# Patient Record
Sex: FEMALE | Race: WHITE | NOT HISPANIC OR LATINO | URBAN - METROPOLITAN AREA
[De-identification: names, ages, dates, MRNs, and addresses within clinical notes are randomized per-mention and may not be internally consistent; named-entity substitution may affect disease eponyms.]

---

## 2017-05-01 ENCOUNTER — OUTPATIENT (OUTPATIENT)
Dept: OUTPATIENT SERVICES | Facility: HOSPITAL | Age: 52
LOS: 1 days | End: 2017-05-01
Payer: COMMERCIAL

## 2017-05-01 ENCOUNTER — APPOINTMENT (OUTPATIENT)
Dept: ULTRASOUND IMAGING | Facility: CLINIC | Age: 52
End: 2017-05-01

## 2017-05-01 ENCOUNTER — APPOINTMENT (OUTPATIENT)
Dept: RADIOLOGY | Facility: CLINIC | Age: 52
End: 2017-05-01

## 2017-05-01 ENCOUNTER — APPOINTMENT (OUTPATIENT)
Dept: MAMMOGRAPHY | Facility: CLINIC | Age: 52
End: 2017-05-01

## 2017-05-01 DIAGNOSIS — Z00.00 ENCOUNTER FOR GENERAL ADULT MEDICAL EXAMINATION WITHOUT ABNORMAL FINDINGS: ICD-10-CM

## 2017-05-01 PROCEDURE — 76641 ULTRASOUND BREAST COMPLETE: CPT

## 2017-05-01 PROCEDURE — 77067 SCR MAMMO BI INCL CAD: CPT

## 2017-05-01 PROCEDURE — 77080 DXA BONE DENSITY AXIAL: CPT

## 2017-05-01 PROCEDURE — 77063 BREAST TOMOSYNTHESIS BI: CPT

## 2017-05-09 DIAGNOSIS — Z78.0 ASYMPTOMATIC MENOPAUSAL STATE: ICD-10-CM

## 2017-05-09 DIAGNOSIS — N60.01 SOLITARY CYST OF RIGHT BREAST: ICD-10-CM

## 2017-05-09 DIAGNOSIS — N60.12 DIFFUSE CYSTIC MASTOPATHY OF LEFT BREAST: ICD-10-CM

## 2017-05-09 DIAGNOSIS — Z12.31 ENCOUNTER FOR SCREENING MAMMOGRAM FOR MALIGNANT NEOPLASM OF BREAST: ICD-10-CM

## 2018-02-26 ENCOUNTER — APPOINTMENT (OUTPATIENT)
Dept: OPHTHALMOLOGY | Facility: CLINIC | Age: 53
End: 2018-02-26
Payer: COMMERCIAL

## 2018-02-26 DIAGNOSIS — H04.123 DRY EYE SYNDROME OF BILATERAL LACRIMAL GLANDS: ICD-10-CM

## 2018-02-26 PROCEDURE — 92002 INTRM OPH EXAM NEW PATIENT: CPT

## 2018-05-21 ENCOUNTER — APPOINTMENT (OUTPATIENT)
Dept: OPHTHALMOLOGY | Facility: CLINIC | Age: 53
End: 2018-05-21

## 2018-08-08 ENCOUNTER — APPOINTMENT (OUTPATIENT)
Dept: PULMONOLOGY | Facility: CLINIC | Age: 53
End: 2018-08-08
Payer: COMMERCIAL

## 2018-08-08 VITALS
HEART RATE: 76 BPM | DIASTOLIC BLOOD PRESSURE: 80 MMHG | HEIGHT: 64 IN | SYSTOLIC BLOOD PRESSURE: 124 MMHG | RESPIRATION RATE: 16 BRPM | WEIGHT: 138 LBS | BODY MASS INDEX: 23.56 KG/M2 | OXYGEN SATURATION: 99 %

## 2018-08-08 DIAGNOSIS — J30.9 ALLERGIC RHINITIS, UNSPECIFIED: ICD-10-CM

## 2018-08-08 DIAGNOSIS — Z00.00 ENCOUNTER FOR GENERAL ADULT MEDICAL EXAMINATION W/OUT ABNORMAL FINDINGS: ICD-10-CM

## 2018-08-08 DIAGNOSIS — H01.024 SQUAMOUS BLEPHARITIS RIGHT UPPER EYELID: ICD-10-CM

## 2018-08-08 DIAGNOSIS — H01.022 SQUAMOUS BLEPHARITIS RIGHT UPPER EYELID: ICD-10-CM

## 2018-08-08 DIAGNOSIS — H01.025 SQUAMOUS BLEPHARITIS RIGHT UPPER EYELID: ICD-10-CM

## 2018-08-08 DIAGNOSIS — H01.021 SQUAMOUS BLEPHARITIS RIGHT UPPER EYELID: ICD-10-CM

## 2018-08-08 DIAGNOSIS — R06.2 WHEEZING: ICD-10-CM

## 2018-08-08 PROCEDURE — 99204 OFFICE O/P NEW MOD 45 MIN: CPT | Mod: 25

## 2018-08-08 PROCEDURE — 94010 BREATHING CAPACITY TEST: CPT

## 2018-08-08 RX ORDER — ALBUTEROL SULFATE 90 UG/1
108 (90 BASE) AEROSOL, METERED RESPIRATORY (INHALATION)
Refills: 0 | Status: ACTIVE | COMMUNITY

## 2018-08-08 RX ORDER — IPRATROPIUM BROMIDE AND ALBUTEROL SULFATE .5; 3 MG/3ML; MG/3ML
2.5-0.5 SOLUTION RESPIRATORY (INHALATION)
Refills: 0 | Status: ACTIVE | COMMUNITY

## 2018-10-29 ENCOUNTER — OUTPATIENT (OUTPATIENT)
Dept: OUTPATIENT SERVICES | Facility: HOSPITAL | Age: 53
LOS: 1 days | End: 2018-10-29
Payer: COMMERCIAL

## 2018-10-29 ENCOUNTER — APPOINTMENT (OUTPATIENT)
Dept: MAMMOGRAPHY | Facility: CLINIC | Age: 53
End: 2018-10-29
Payer: COMMERCIAL

## 2018-10-29 ENCOUNTER — APPOINTMENT (OUTPATIENT)
Dept: ULTRASOUND IMAGING | Facility: CLINIC | Age: 53
End: 2018-10-29
Payer: COMMERCIAL

## 2018-10-29 DIAGNOSIS — Z12.31 ENCOUNTER FOR SCREENING MAMMOGRAM FOR MALIGNANT NEOPLASM OF BREAST: ICD-10-CM

## 2018-10-29 PROCEDURE — 77067 SCR MAMMO BI INCL CAD: CPT

## 2018-10-29 PROCEDURE — 77067 SCR MAMMO BI INCL CAD: CPT | Mod: 26

## 2018-10-29 PROCEDURE — 77063 BREAST TOMOSYNTHESIS BI: CPT | Mod: 26

## 2018-10-29 PROCEDURE — 77063 BREAST TOMOSYNTHESIS BI: CPT

## 2018-11-05 ENCOUNTER — APPOINTMENT (OUTPATIENT)
Dept: PULMONOLOGY | Facility: CLINIC | Age: 53
End: 2018-11-05

## 2019-01-04 ENCOUNTER — RX CHANGE (OUTPATIENT)
Age: 54
End: 2019-01-04

## 2019-04-04 ENCOUNTER — CHART COPY (OUTPATIENT)
Age: 54
End: 2019-04-04

## 2019-04-29 ENCOUNTER — RX RENEWAL (OUTPATIENT)
Age: 54
End: 2019-04-29

## 2019-04-29 RX ORDER — OSELTAMIVIR PHOSPHATE 75 MG/1
75 CAPSULE ORAL TWICE DAILY
Qty: 1 | Refills: 0 | Status: COMPLETED | COMMUNITY
Start: 2019-01-04 | End: 2019-04-29

## 2019-04-29 RX ORDER — AMOXICILLIN AND CLAVULANATE POTASSIUM 500; 125 MG/1; MG/1
500-125 TABLET, FILM COATED ORAL TWICE DAILY
Qty: 14 | Refills: 0 | Status: COMPLETED | COMMUNITY
Start: 2019-02-11 | End: 2019-04-29

## 2019-04-29 RX ORDER — OSELTAMIVIR PHOSPHATE 75 MG/1
75 CAPSULE ORAL TWICE DAILY
Qty: 10 | Refills: 0 | Status: COMPLETED | COMMUNITY
Start: 2019-01-04 | End: 2019-04-29

## 2020-07-20 ENCOUNTER — OUTPATIENT (OUTPATIENT)
Dept: OUTPATIENT SERVICES | Facility: HOSPITAL | Age: 55
LOS: 1 days | End: 2020-07-20

## 2020-07-20 DIAGNOSIS — Z12.31 ENCOUNTER FOR SCREENING MAMMOGRAM FOR MALIGNANT NEOPLASM OF BREAST: ICD-10-CM

## 2020-07-22 ENCOUNTER — APPOINTMENT (OUTPATIENT)
Dept: PULMONOLOGY | Facility: CLINIC | Age: 55
End: 2020-07-22
Payer: COMMERCIAL

## 2020-07-22 VITALS
SYSTOLIC BLOOD PRESSURE: 118 MMHG | HEART RATE: 77 BPM | DIASTOLIC BLOOD PRESSURE: 70 MMHG | HEIGHT: 64 IN | WEIGHT: 134 LBS | BODY MASS INDEX: 22.88 KG/M2 | OXYGEN SATURATION: 98 %

## 2020-07-22 PROCEDURE — 99215 OFFICE O/P EST HI 40 MIN: CPT

## 2020-07-22 RX ORDER — NITROFURANTOIN (MONOHYDRATE/MACROCRYSTALS) 25; 75 MG/1; MG/1
100 CAPSULE ORAL TWICE DAILY
Qty: 10 | Refills: 0 | Status: DISCONTINUED | COMMUNITY
Start: 2019-04-29 | End: 2020-07-22

## 2020-07-22 RX ORDER — AMOXICILLIN AND CLAVULANATE POTASSIUM 500; 125 MG/1; 1/1
TABLET, FILM COATED ORAL
Refills: 0 | Status: ACTIVE | COMMUNITY

## 2020-07-22 RX ORDER — ALBUTEROL SULFATE 2.5 MG/3ML
(2.5 MG/3ML) SOLUTION RESPIRATORY (INHALATION)
Qty: 1 | Refills: 3 | Status: ACTIVE | COMMUNITY
Start: 2020-07-22 | End: 1900-01-01

## 2020-07-22 RX ORDER — ALBUTEROL SULFATE 90 UG/1
108 (90 BASE) AEROSOL, METERED RESPIRATORY (INHALATION)
Qty: 1 | Refills: 3 | Status: ACTIVE | COMMUNITY
Start: 2020-07-22 | End: 1900-01-01

## 2020-07-22 NOTE — HISTORY OF PRESENT ILLNESS
[TextBox_4] : had nasal discharge, roslyn baires MD, given augmentin -9 days\par nasal discharge improved\par voice hoarse, has post nasal drip, cough worse when lying supine\par has hx asthma, uses neb daily\par works [employer]\par has rescue inhaler which helps\par recent covid [facility], results pending\par

## 2020-07-22 NOTE — DISCUSSION/SUMMARY
[FreeTextEntry1] : rhinosinusitis /upper airway cough, asthma by hx, ? GERD component\par currently without any dyspnea, lung exam is normal, normal sp02\par previous spirometries were normal, minimal allergies followed at ENT/allergy, minimal smoking hx\par Covid pending from [facility]\par Symbicort with spacer, medrol, add dymista \par ENT to see, add pepcid, HOB elevated\par PFTs/Covid, CXR\par 3 months or sooner if needed

## 2020-07-22 NOTE — PHYSICAL EXAM
[No Acute Distress] : no acute distress [Normal Rate/Rhythm] : normal rate/rhythm [Normal Appearance] : normal appearance [Normal S1, S2] : normal s1, s2 [No Murmurs] : no murmurs [No Resp Distress] : no resp distress [Normal Rhythm and Effort] : normal rhythm and effort [Clear to Auscultation Bilaterally] : clear to auscultation bilaterally [No Abnormalities] : no abnormalities [Normal Gait] : normal gait [No Clubbing] : no clubbing [No Cyanosis] : no cyanosis [No Edema] : no edema [Normal Color/ Pigmentation] : normal color/ pigmentation [Normal Affect] : normal affect [Oriented x3] : oriented x3 [No Focal Deficits] : no focal deficits

## 2020-07-22 NOTE — CONSULT LETTER
[Consult Letter:] : I had the pleasure of evaluating your patient, [unfilled]. [Dear  ___] : Dear  [unfilled], [Sincerely,] : Sincerely, [Please see my note below.] : Please see my note below. [FreeTextEntry3] : Jason Coredro DO [facility]\par Pulmonary Critical Care\par Director Pulmonary Division\par Medical Director Respiratory Therapy\par [facility]\par \par

## 2020-08-16 DIAGNOSIS — Z12.31 ENCOUNTER FOR SCREENING MAMMOGRAM FOR MALIGNANT NEOPLASM OF BREAST: ICD-10-CM

## 2020-08-17 ENCOUNTER — APPOINTMENT (OUTPATIENT)
Dept: MAMMOGRAPHY | Facility: CLINIC | Age: 55
End: 2020-08-17

## 2020-08-17 ENCOUNTER — OUTPATIENT (OUTPATIENT)
Dept: OUTPATIENT SERVICES | Facility: HOSPITAL | Age: 55
LOS: 1 days | End: 2020-08-17

## 2020-08-17 DIAGNOSIS — Z12.31 ENCOUNTER FOR SCREENING MAMMOGRAM FOR MALIGNANT NEOPLASM OF BREAST: ICD-10-CM

## 2020-09-29 ENCOUNTER — OUTPATIENT (OUTPATIENT)
Dept: OUTPATIENT SERVICES | Facility: HOSPITAL | Age: 55
LOS: 1 days | End: 2020-09-29
Payer: COMMERCIAL

## 2020-09-29 ENCOUNTER — APPOINTMENT (OUTPATIENT)
Dept: MAMMOGRAPHY | Facility: CLINIC | Age: 55
End: 2020-09-29
Payer: COMMERCIAL

## 2020-09-29 ENCOUNTER — RESULT REVIEW (OUTPATIENT)
Age: 55
End: 2020-09-29

## 2020-09-29 DIAGNOSIS — Z12.31 ENCOUNTER FOR SCREENING MAMMOGRAM FOR MALIGNANT NEOPLASM OF BREAST: ICD-10-CM

## 2020-09-29 PROCEDURE — 77067 SCR MAMMO BI INCL CAD: CPT | Mod: 26

## 2020-09-29 PROCEDURE — 77067 SCR MAMMO BI INCL CAD: CPT

## 2020-09-29 PROCEDURE — 77063 BREAST TOMOSYNTHESIS BI: CPT

## 2020-09-29 PROCEDURE — 77063 BREAST TOMOSYNTHESIS BI: CPT | Mod: 26

## 2020-10-14 ENCOUNTER — APPOINTMENT (OUTPATIENT)
Dept: OBGYN | Facility: CLINIC | Age: 55
End: 2020-10-14
Payer: COMMERCIAL

## 2020-10-14 VITALS
WEIGHT: 134 LBS | BODY MASS INDEX: 22.88 KG/M2 | SYSTOLIC BLOOD PRESSURE: 120 MMHG | DIASTOLIC BLOOD PRESSURE: 74 MMHG | HEIGHT: 64 IN

## 2020-10-14 DIAGNOSIS — Z87.42 PERSONAL HISTORY OF OTHER DISEASES OF THE FEMALE GENITAL TRACT: ICD-10-CM

## 2020-10-14 DIAGNOSIS — Z87.440 PERSONAL HISTORY OF URINARY (TRACT) INFECTIONS: ICD-10-CM

## 2020-10-14 DIAGNOSIS — Z78.9 OTHER SPECIFIED HEALTH STATUS: ICD-10-CM

## 2020-10-14 PROCEDURE — 81003 URINALYSIS AUTO W/O SCOPE: CPT | Mod: QW

## 2020-10-14 PROCEDURE — 99203 OFFICE O/P NEW LOW 30 MIN: CPT

## 2020-10-14 RX ORDER — NITROFURANTOIN (MONOHYDRATE/MACROCRYSTALS) 25; 75 MG/1; MG/1
100 CAPSULE ORAL TWICE DAILY
Qty: 10 | Refills: 0 | Status: ACTIVE | COMMUNITY
Start: 2020-10-14 | End: 1900-01-01

## 2020-10-15 PROBLEM — Z78.9 SOCIAL ALCOHOL USE: Status: ACTIVE | Noted: 2020-10-15

## 2020-10-15 NOTE — PLAN
[FreeTextEntry1] : 55 year old female with suspected uti.  Urine culture sent.  Vaginal culture sent.  Pelvic exam with vaginal dryness.  We discussed that uti's can increase after menopause due to dryness and atrophy.  We discussed starting post coital prophylaxis versus vaginal estrogen.  She is not interested in hormones at this time.  Will start post coital prophylaxis.  Will also rx macrobid 100mg BID x 5 days and pyridium 200mg TID prn.  call parameters reviewed.  Will RTO for wwe.

## 2020-10-15 NOTE — HISTORY OF PRESENT ILLNESS
[FreeTextEntry1] : 55 year old female presents for visit due to suspected uti.  She is a new patient to our practice.  She was previously seen at [facility] but has not seen me since early 2017.  She states 2 weeks ago she thought she had a uti.  She was treated with a course of macrobid.  States initially she was feeling better but had intercourse and symptoms returned.  She has been taking azo OTC with minimal relief.  She is complaining of urinary frequency, urgency and dysuria.  She denies vaginal symptoms.  She denies fever.  No back or pelvic pain.  She states she has noticed that since menopause she has been getting more frequent uti's.  She does experience some vaginal dryness.  \par

## 2020-10-15 NOTE — PHYSICAL EXAM
[Appropriately responsive] : appropriately responsive [Alert] : alert [No Acute Distress] : no acute distress [Soft] : soft [Non-tender] : non-tender [Non-distended] : non-distended [No HSM] : No HSM [No Lesions] : no lesions [No Mass] : no mass [Oriented x3] : oriented x3 [FreeTextEntry7] : No CVA tenderness [Labia Majora] : normal [Labia Minora] : normal [Dry Mucosa] : dry mucosa [Normal] : normal [Uterine Adnexae] : normal

## 2020-10-17 LAB
A VAGINAE DNA VAG QL NAA+PROBE: NORMAL
BACTERIA UR CULT: ABNORMAL
BVAB2 DNA VAG QL NAA+PROBE: NORMAL
C KRUSEI DNA VAG QL NAA+PROBE: NEGATIVE
C TRACH RRNA SPEC QL NAA+PROBE: NEGATIVE
MEGA1 DNA VAG QL NAA+PROBE: NORMAL
N GONORRHOEA RRNA SPEC QL NAA+PROBE: NEGATIVE
T VAGINALIS RRNA SPEC QL NAA+PROBE: NEGATIVE

## 2020-12-23 PROBLEM — Z87.440 HISTORY OF URINARY TRACT INFECTION: Status: RESOLVED | Noted: 2019-04-29 | Resolved: 2020-12-23

## 2020-12-23 PROBLEM — Z87.42 HISTORY OF VAGINITIS: Status: RESOLVED | Noted: 2020-10-14 | Resolved: 2020-12-23

## 2021-01-11 ENCOUNTER — APPOINTMENT (OUTPATIENT)
Dept: PULMONOLOGY | Facility: CLINIC | Age: 56
End: 2021-01-11
Payer: COMMERCIAL

## 2021-01-11 DIAGNOSIS — R06.00 DYSPNEA, UNSPECIFIED: ICD-10-CM

## 2021-01-11 PROCEDURE — 99214 OFFICE O/P EST MOD 30 MIN: CPT | Mod: 95

## 2021-01-12 ENCOUNTER — LABORATORY RESULT (OUTPATIENT)
Age: 56
End: 2021-01-12

## 2021-01-14 ENCOUNTER — NON-APPOINTMENT (OUTPATIENT)
Age: 56
End: 2021-01-14

## 2021-01-19 ENCOUNTER — TRANSCRIPTION ENCOUNTER (OUTPATIENT)
Age: 56
End: 2021-01-19

## 2021-09-13 ENCOUNTER — RX RENEWAL (OUTPATIENT)
Age: 56
End: 2021-09-13

## 2021-10-18 ENCOUNTER — RX CHANGE (OUTPATIENT)
Age: 56
End: 2021-10-18

## 2021-10-18 RX ORDER — BUDESONIDE AND FORMOTEROL FUMARATE DIHYDRATE 160; 4.5 UG/1; UG/1
160-4.5 AEROSOL RESPIRATORY (INHALATION) TWICE DAILY
Qty: 3 | Refills: 3 | Status: DISCONTINUED | COMMUNITY
Start: 2021-10-18 | End: 2021-10-18

## 2021-10-18 RX ORDER — INHALER, ASSIST DEVICES
SPACER (EA) MISCELLANEOUS
Qty: 1 | Refills: 4 | Status: ACTIVE | COMMUNITY
Start: 2020-07-22 | End: 1900-01-01

## 2021-10-19 ENCOUNTER — RX CHANGE (OUTPATIENT)
Age: 56
End: 2021-10-19

## 2021-11-10 ENCOUNTER — APPOINTMENT (OUTPATIENT)
Dept: OBGYN | Facility: CLINIC | Age: 56
End: 2021-11-10
Payer: COMMERCIAL

## 2021-11-10 VITALS
SYSTOLIC BLOOD PRESSURE: 148 MMHG | HEIGHT: 64 IN | DIASTOLIC BLOOD PRESSURE: 88 MMHG | BODY MASS INDEX: 21.85 KG/M2 | WEIGHT: 128 LBS

## 2021-11-10 PROCEDURE — 99396 PREV VISIT EST AGE 40-64: CPT

## 2021-11-22 LAB
CYTOLOGY CVX/VAG DOC THIN PREP: ABNORMAL
HPV HIGH+LOW RISK DNA PNL CVX: NOT DETECTED

## 2021-12-28 ENCOUNTER — OUTPATIENT (OUTPATIENT)
Dept: OUTPATIENT SERVICES | Facility: HOSPITAL | Age: 56
LOS: 1 days | End: 2021-12-28

## 2021-12-28 DIAGNOSIS — Z00.8 ENCOUNTER FOR OTHER GENERAL EXAMINATION: ICD-10-CM

## 2021-12-29 ENCOUNTER — APPOINTMENT (OUTPATIENT)
Dept: MAMMOGRAPHY | Facility: CLINIC | Age: 56
End: 2021-12-29
Payer: COMMERCIAL

## 2021-12-29 ENCOUNTER — RESULT REVIEW (OUTPATIENT)
Age: 56
End: 2021-12-29

## 2021-12-29 ENCOUNTER — OUTPATIENT (OUTPATIENT)
Dept: OUTPATIENT SERVICES | Facility: HOSPITAL | Age: 56
LOS: 1 days | End: 2021-12-29
Payer: COMMERCIAL

## 2021-12-29 DIAGNOSIS — Z00.8 ENCOUNTER FOR OTHER GENERAL EXAMINATION: ICD-10-CM

## 2021-12-29 DIAGNOSIS — Z12.31 ENCOUNTER FOR SCREENING MAMMOGRAM FOR MALIGNANT NEOPLASM OF BREAST: ICD-10-CM

## 2021-12-29 PROCEDURE — 77067 SCR MAMMO BI INCL CAD: CPT | Mod: 26

## 2021-12-29 PROCEDURE — 77063 BREAST TOMOSYNTHESIS BI: CPT

## 2021-12-29 PROCEDURE — 77067 SCR MAMMO BI INCL CAD: CPT

## 2021-12-29 PROCEDURE — 77063 BREAST TOMOSYNTHESIS BI: CPT | Mod: 26

## 2022-01-06 NOTE — PLAN
[FreeTextEntry1] : 56 year old female wwe\par \par 1. Pap done\par 2. Rx screening mammo and sono\par 3. Counseled on dexa - will get at age 64 \par 4. Up to date with screening colonoscopy\par 5. Vaginal atrophy - continue lubrication and vaginal moisturizers.  If no relief can discuss other treatment options. \par 6. Annual exam in 1 year

## 2022-01-06 NOTE — HISTORY OF PRESENT ILLNESS
[FreeTextEntry1] : 56 year old female presents for wwe.  She has no complaints today.  LMP was at the age of 49.  She does have some vaginal dryness and uses lubrication and revaree with good results.  She denies hot flashes.  She denies bladder and bowel symptoms.  PMH is significant for asthma.  She has had a LEEP in the past.  She is due for a mammo.  She is up to date with her screening colonoscopy.

## 2022-04-21 DIAGNOSIS — N39.0 URINARY TRACT INFECTION, SITE NOT SPECIFIED: ICD-10-CM

## 2022-04-21 RX ORDER — SULFAMETHOXAZOLE AND TRIMETHOPRIM 800; 160 MG/1; MG/1
800-160 TABLET ORAL TWICE DAILY
Qty: 14 | Refills: 0 | Status: ACTIVE | COMMUNITY
Start: 2022-04-21 | End: 1900-01-01

## 2022-04-25 LAB — BACTERIA UR CULT: NORMAL

## 2022-05-31 ENCOUNTER — RX RENEWAL (OUTPATIENT)
Age: 57
End: 2022-05-31

## 2022-06-01 ENCOUNTER — NON-APPOINTMENT (OUTPATIENT)
Age: 57
End: 2022-06-01

## 2022-06-27 ENCOUNTER — RX RENEWAL (OUTPATIENT)
Age: 57
End: 2022-06-27

## 2022-10-24 ENCOUNTER — APPOINTMENT (OUTPATIENT)
Dept: PULMONOLOGY | Facility: CLINIC | Age: 57
End: 2022-10-24

## 2022-12-23 RX ORDER — MONTELUKAST 10 MG/1
10 TABLET, FILM COATED ORAL DAILY
Qty: 30 | Refills: 0 | Status: ACTIVE | COMMUNITY
Start: 2018-08-08 | End: 1900-01-01

## 2022-12-23 RX ORDER — BUDESONIDE AND FORMOTEROL FUMARATE DIHYDRATE 160; 4.5 UG/1; UG/1
160-4.5 AEROSOL RESPIRATORY (INHALATION)
Qty: 1 | Refills: 0 | Status: ACTIVE | COMMUNITY
Start: 2021-10-18 | End: 1900-01-01

## 2023-01-01 ENCOUNTER — NON-APPOINTMENT (OUTPATIENT)
Age: 58
End: 2023-01-01

## 2023-01-12 ENCOUNTER — APPOINTMENT (OUTPATIENT)
Dept: MAMMOGRAPHY | Facility: CLINIC | Age: 58
End: 2023-01-12
Payer: COMMERCIAL

## 2023-01-12 ENCOUNTER — OUTPATIENT (OUTPATIENT)
Dept: OUTPATIENT SERVICES | Facility: HOSPITAL | Age: 58
LOS: 1 days | End: 2023-01-12
Payer: COMMERCIAL

## 2023-01-12 ENCOUNTER — APPOINTMENT (OUTPATIENT)
Dept: PULMONOLOGY | Facility: CLINIC | Age: 58
End: 2023-01-12
Payer: COMMERCIAL

## 2023-01-12 VITALS
DIASTOLIC BLOOD PRESSURE: 82 MMHG | SYSTOLIC BLOOD PRESSURE: 132 MMHG | RESPIRATION RATE: 16 BRPM | BODY MASS INDEX: 22.02 KG/M2 | HEART RATE: 80 BPM | OXYGEN SATURATION: 97 % | WEIGHT: 129 LBS | HEIGHT: 64 IN

## 2023-01-12 DIAGNOSIS — J45.909 UNSPECIFIED ASTHMA, UNCOMPLICATED: ICD-10-CM

## 2023-01-12 DIAGNOSIS — U07.1 COVID-19: ICD-10-CM

## 2023-01-12 DIAGNOSIS — R05.9 COUGH, UNSPECIFIED: ICD-10-CM

## 2023-01-12 DIAGNOSIS — Z12.31 ENCOUNTER FOR SCREENING MAMMOGRAM FOR MALIGNANT NEOPLASM OF BREAST: ICD-10-CM

## 2023-01-12 DIAGNOSIS — Z87.891 PERSONAL HISTORY OF NICOTINE DEPENDENCE: ICD-10-CM

## 2023-01-12 PROCEDURE — 77063 BREAST TOMOSYNTHESIS BI: CPT

## 2023-01-12 PROCEDURE — 77067 SCR MAMMO BI INCL CAD: CPT | Mod: 26

## 2023-01-12 PROCEDURE — 77063 BREAST TOMOSYNTHESIS BI: CPT | Mod: 26

## 2023-01-12 PROCEDURE — 99214 OFFICE O/P EST MOD 30 MIN: CPT

## 2023-01-12 PROCEDURE — 77067 SCR MAMMO BI INCL CAD: CPT

## 2023-01-12 RX ORDER — PHENAZOPYRIDINE 200 MG/1
200 TABLET, FILM COATED ORAL 3 TIMES DAILY
Qty: 15 | Refills: 0 | Status: DISCONTINUED | COMMUNITY
Start: 2020-10-14 | End: 2023-01-12

## 2023-01-12 RX ORDER — NITROFURANTOIN (MONOHYDRATE/MACROCRYSTALS) 25; 75 MG/1; MG/1
100 CAPSULE ORAL
Qty: 90 | Refills: 0 | Status: DISCONTINUED | COMMUNITY
Start: 2020-10-15 | End: 2023-01-12

## 2023-01-12 RX ORDER — METHYLPREDNISOLONE 4 MG/1
4 TABLET ORAL
Qty: 1 | Refills: 3 | Status: DISCONTINUED | COMMUNITY
Start: 2020-07-22 | End: 2023-01-12

## 2023-01-12 RX ORDER — NITROFURANTOIN (MONOHYDRATE/MACROCRYSTALS) 25; 75 MG/1; MG/1
100 CAPSULE ORAL TWICE DAILY
Qty: 10 | Refills: 0 | Status: DISCONTINUED | COMMUNITY
Start: 2020-09-29 | End: 2023-01-12

## 2023-01-12 RX ORDER — PHENAZOPYRIDINE 200 MG/1
200 TABLET, FILM COATED ORAL 3 TIMES DAILY
Qty: 15 | Refills: 0 | Status: DISCONTINUED | COMMUNITY
Start: 2022-04-21 | End: 2023-01-12

## 2023-01-12 RX ORDER — FLUCONAZOLE 150 MG/1
150 TABLET ORAL
Qty: 1 | Refills: 0 | Status: DISCONTINUED | COMMUNITY
Start: 2020-10-14 | End: 2023-01-12

## 2023-01-12 RX ORDER — FLUTICASONE PROPIONATE 50 UG/1
50 SPRAY, METERED NASAL DAILY
Qty: 1 | Refills: 3 | Status: ACTIVE | COMMUNITY
Start: 2023-01-12 | End: 1900-01-01

## 2023-01-12 NOTE — PHYSICAL EXAM
[General Appearance - In No Acute Distress] : no acute distress [Normal Conjunctiva] : the conjunctiva exhibited no abnormalities [Normal Oropharynx] : normal oropharynx [Neck Appearance] : the appearance of the neck was normal [] : the neck was supple [Heart Rate And Rhythm] : heart rate and rhythm were normal [Heart Sounds] : normal S1 and S2 [Normal Rate] : the respiratory rate was normal [Rate ___] : at [unfilled] breaths per minute [Clear Bilaterally] : the lungs were clear to auscultation bilaterally [Normal Breath Sounds] : normal bilateral breath sounds [Bowel Sounds] : normal bowel sounds [Abdomen Soft] : soft [Abnormal Walk] : normal gait [Nail Clubbing] : no clubbing of the fingernails [Cyanosis, Localized] : no localized cyanosis [No Focal Deficits] : no focal deficits [Oriented To Time, Place, And Person] : oriented to person, place, and time [Impaired Insight] : insight and judgment were intact [Affect] : the affect was normal

## 2023-01-12 NOTE — HISTORY OF PRESENT ILLNESS
[FreeTextEntry1] : Hx asthma. \par \par Had covid 1/2021. Treated with decadron. Improved. Another bout in Dec. Mild. No trt. \par \par Doing well with regards to asthma. No wheeze, sob. Only issue is throat clearing. Mucous in throat. On symbicort. Singulair.    \par \par Dx with SVT. Sees Dr Bliss.

## 2023-04-10 RX ORDER — AMOXICILLIN AND CLAVULANATE POTASSIUM 875; 125 MG/1; MG/1
875-125 TABLET, COATED ORAL TWICE DAILY
Qty: 14 | Refills: 0 | Status: ACTIVE | COMMUNITY
Start: 2023-04-10 | End: 1900-01-01

## 2023-09-06 RX ORDER — PHENAZOPYRIDINE 200 MG/1
200 TABLET, FILM COATED ORAL 3 TIMES DAILY
Qty: 30 | Refills: 0 | Status: ACTIVE | COMMUNITY
Start: 2023-09-06 | End: 1900-01-01

## 2023-09-06 RX ORDER — SULFAMETHOXAZOLE AND TRIMETHOPRIM 800; 160 MG/1; MG/1
800-160 TABLET ORAL TWICE DAILY
Qty: 10 | Refills: 0 | Status: ACTIVE | COMMUNITY
Start: 2023-09-06 | End: 1900-01-01

## 2023-09-07 ENCOUNTER — APPOINTMENT (OUTPATIENT)
Dept: DERMATOLOGY | Facility: CLINIC | Age: 58
End: 2023-09-07
Payer: COMMERCIAL

## 2023-09-07 PROCEDURE — 99203 OFFICE O/P NEW LOW 30 MIN: CPT

## 2023-09-08 LAB — BACTERIA UR CULT: NORMAL

## 2023-09-30 ENCOUNTER — NON-APPOINTMENT (OUTPATIENT)
Age: 58
End: 2023-09-30

## 2023-10-03 ENCOUNTER — APPOINTMENT (OUTPATIENT)
Dept: UROGYNECOLOGY | Facility: CLINIC | Age: 58
End: 2023-10-03
Payer: COMMERCIAL

## 2023-10-03 VITALS
RESPIRATION RATE: 16 BRPM | HEART RATE: 72 BPM | WEIGHT: 127 LBS | OXYGEN SATURATION: 98 % | BODY MASS INDEX: 20.66 KG/M2 | SYSTOLIC BLOOD PRESSURE: 134 MMHG | HEIGHT: 65.75 IN | DIASTOLIC BLOOD PRESSURE: 86 MMHG

## 2023-10-03 DIAGNOSIS — Z86.19 PERSONAL HISTORY OF OTHER INFECTIOUS AND PARASITIC DISEASES: ICD-10-CM

## 2023-10-03 DIAGNOSIS — R10.2 PELVIC AND PERINEAL PAIN: ICD-10-CM

## 2023-10-03 LAB
BILIRUB UR QL STRIP: NEGATIVE
CLARITY UR: CLEAR
COLLECTION METHOD: NORMAL
GLUCOSE UR-MCNC: NEGATIVE
HCG UR QL: 0.2 EU/DL
HGB UR QL STRIP.AUTO: NORMAL
KETONES UR-MCNC: NEGATIVE
LEUKOCYTE ESTERASE UR QL STRIP: NEGATIVE
NITRITE UR QL STRIP: NEGATIVE
PH UR STRIP: 6.5
PROT UR STRIP-MCNC: NEGATIVE
SP GR UR STRIP: <=1.005

## 2023-10-03 PROCEDURE — 99214 OFFICE O/P EST MOD 30 MIN: CPT | Mod: 25

## 2023-10-03 PROCEDURE — 81003 URINALYSIS AUTO W/O SCOPE: CPT | Mod: QW

## 2023-10-03 PROCEDURE — 99204 OFFICE O/P NEW MOD 45 MIN: CPT | Mod: 25

## 2023-10-03 PROCEDURE — 51701 INSERT BLADDER CATHETER: CPT | Mod: 59

## 2023-10-04 LAB
APPEARANCE: CLEAR
BACTERIA: NEGATIVE /HPF
BILIRUBIN URINE: NEGATIVE
BLOOD URINE: NEGATIVE
CAST: 0 /LPF
COLOR: YELLOW
EPITHELIAL CELLS: 0 /HPF
GLUCOSE QUALITATIVE U: NEGATIVE MG/DL
KETONES URINE: NEGATIVE MG/DL
LEUKOCYTE ESTERASE URINE: NEGATIVE
MICROSCOPIC-UA: NORMAL
NITRITE URINE: NEGATIVE
PH URINE: 6.5
PROTEIN URINE: NEGATIVE MG/DL
RED BLOOD CELLS URINE: 0 /HPF
SPECIFIC GRAVITY URINE: 1.01
UROBILINOGEN URINE: 0.2 MG/DL
WHITE BLOOD CELLS URINE: 0 /HPF

## 2023-10-05 LAB — BACTERIA UR CULT: NORMAL

## 2023-10-30 ENCOUNTER — APPOINTMENT (OUTPATIENT)
Dept: OBGYN | Facility: CLINIC | Age: 58
End: 2023-10-30
Payer: COMMERCIAL

## 2023-10-30 ENCOUNTER — NON-APPOINTMENT (OUTPATIENT)
Age: 58
End: 2023-10-30

## 2023-10-30 VITALS
BODY MASS INDEX: 21.99 KG/M2 | WEIGHT: 132 LBS | DIASTOLIC BLOOD PRESSURE: 84 MMHG | SYSTOLIC BLOOD PRESSURE: 130 MMHG | HEIGHT: 65 IN

## 2023-10-30 DIAGNOSIS — M62.89 OTHER SPECIFIED DISORDERS OF MUSCLE: ICD-10-CM

## 2023-10-30 DIAGNOSIS — D25.9 LEIOMYOMA OF UTERUS, UNSPECIFIED: ICD-10-CM

## 2023-10-30 DIAGNOSIS — Z01.419 ENCOUNTER FOR GYNECOLOGICAL EXAMINATION (GENERAL) (ROUTINE) W/OUT ABNORMAL FINDINGS: ICD-10-CM

## 2023-10-30 DIAGNOSIS — N95.2 POSTMENOPAUSAL ATROPHIC VAGINITIS: ICD-10-CM

## 2023-10-30 DIAGNOSIS — R39.9 UNSPECIFIED SYMPTOMS AND SIGNS INVOLVING THE GENITOURINARY SYSTEM: ICD-10-CM

## 2023-10-30 PROCEDURE — 99396 PREV VISIT EST AGE 40-64: CPT

## 2023-11-06 LAB
CYTOLOGY CVX/VAG DOC THIN PREP: ABNORMAL
HPV HIGH+LOW RISK DNA PNL CVX: NOT DETECTED

## 2023-12-04 RX ORDER — DIAZEPAM 100 %
POWDER (GRAM) MISCELLANEOUS
Qty: 90 | Refills: 0 | Status: ACTIVE | COMMUNITY
Start: 2023-10-04 | End: 1900-01-01

## 2023-12-22 RX ORDER — AMOXICILLIN AND CLAVULANATE POTASSIUM 875; 125 MG/1; MG/1
875-125 TABLET, COATED ORAL TWICE DAILY
Qty: 14 | Refills: 0 | Status: ACTIVE | COMMUNITY
Start: 2023-12-22 | End: 1900-01-01

## 2024-01-06 PROBLEM — M62.89 PELVIC FLOOR TENSION: Status: ACTIVE | Noted: 2023-10-03

## 2024-01-06 PROBLEM — Z01.419 WELL WOMAN EXAM WITH ROUTINE GYNECOLOGICAL EXAM: Status: ACTIVE | Noted: 2022-01-06

## 2024-01-06 PROBLEM — N95.2 ATROPHIC VAGINITIS: Status: ACTIVE | Noted: 2023-10-03

## 2024-01-06 PROBLEM — R39.9 UTI SYMPTOMS: Status: ACTIVE | Noted: 2023-10-03

## 2024-01-06 PROBLEM — D25.9 FIBROID UTERUS: Status: ACTIVE | Noted: 2024-01-06

## 2024-01-24 ENCOUNTER — APPOINTMENT (OUTPATIENT)
Dept: UROGYNECOLOGY | Facility: CLINIC | Age: 59
End: 2024-01-24
Payer: COMMERCIAL

## 2024-01-24 VITALS
BODY MASS INDEX: 20.91 KG/M2 | HEART RATE: 79 BPM | DIASTOLIC BLOOD PRESSURE: 64 MMHG | SYSTOLIC BLOOD PRESSURE: 99 MMHG | WEIGHT: 127 LBS | HEIGHT: 65.5 IN

## 2024-01-24 LAB
BILIRUB UR QL STRIP: NEGATIVE
CLARITY UR: CLEAR
COLLECTION METHOD: NORMAL
GLUCOSE UR-MCNC: NEGATIVE
HCG UR QL: 0.2 EU/DL
HGB UR QL STRIP.AUTO: NEGATIVE
KETONES UR-MCNC: NEGATIVE
LEUKOCYTE ESTERASE UR QL STRIP: NEGATIVE
NITRITE UR QL STRIP: NEGATIVE
PH UR STRIP: 7.5
PROT UR STRIP-MCNC: NEGATIVE
SP GR UR STRIP: 1.02

## 2024-01-24 PROCEDURE — 99213 OFFICE O/P EST LOW 20 MIN: CPT

## 2024-01-24 PROCEDURE — 81003 URINALYSIS AUTO W/O SCOPE: CPT | Mod: QW

## 2024-01-24 PROCEDURE — 51798 US URINE CAPACITY MEASURE: CPT

## 2024-01-24 NOTE — ADDENDUM
[FreeTextEntry1] : This note was written by Michelle Macario, acting as the  for Dr. Lorenz. This note accurately reflects the work and decisions made by Dr. Lorenz.

## 2024-01-24 NOTE — PHYSICAL EXAM
[Chaperone Present] : A chaperone was present in the examining room during all aspects of the physical examination [No Acute Distress] : in no acute distress [Well developed] : well developed [Well Nourished] : ~L well nourished [Oriented x3] : oriented to person, place, and time [Cough] : no cough [No Edema] : ~T edema was not present

## 2024-01-24 NOTE — ASSESSMENT
[FreeTextEntry1] : MIKEL is a 58 year female who presents for f/u on PFD/atrophy. Patient reports she has 90% improvement in symptoms with her current regimen and is very happy with results.   Plan: [] Continue pelvic floor PT and yoga [] Continue vaginal valium suppositories [] Continue estrogen cream twice weekly  [] Recommended heating pads  f/u as needed

## 2024-01-24 NOTE — HISTORY OF PRESENT ILLNESS
[FreeTextEntry1] : MIKEL is a 58 year female who presents for f/u on vaginal atrophy, PFD, questionable UTIs. On initial exam 10/03/2023,  ml, dip trace blood, no POP on POPQ, tight levators b/l with TPs noted, and atrophy noted c/w GUSM. Reports she has been going to PF PT, practicing yoga and using vaginal estrogen with 90% improvement in symptoms. Reports she only has one more session of PF PT. Uses valium as needed which she states completely relieves pain. Patient states she is no longer suffering and feels much better.   PMH asthma PSH breast augmentation, lithotripsy, LEEP  x 2, largest 7 lbs 14 oz +SA, LMP age 49 Prior smoker Current meds include macrobid, azo, ibuprofen prn, pydirium, revaree, cranberry, d-mannose  Transvaginal US 10/12/2023 - leiomyomatous uterus with largest fibroid 3.7cm  Renal US 10/05/2023 - normal

## 2024-02-21 RX ORDER — ESTRADIOL 10 UG/1
10 TABLET, FILM COATED VAGINAL
Qty: 24 | Refills: 3 | Status: ACTIVE | COMMUNITY
Start: 2023-10-25 | End: 1900-01-01

## 2024-02-27 ENCOUNTER — RESULT REVIEW (OUTPATIENT)
Age: 59
End: 2024-02-27

## 2024-02-27 ENCOUNTER — APPOINTMENT (OUTPATIENT)
Dept: MAMMOGRAPHY | Facility: CLINIC | Age: 59
End: 2024-02-27
Payer: COMMERCIAL

## 2024-02-27 ENCOUNTER — OUTPATIENT (OUTPATIENT)
Dept: OUTPATIENT SERVICES | Facility: HOSPITAL | Age: 59
LOS: 1 days | End: 2024-02-27
Payer: COMMERCIAL

## 2024-02-27 DIAGNOSIS — Z00.8 ENCOUNTER FOR OTHER GENERAL EXAMINATION: ICD-10-CM

## 2024-02-27 PROCEDURE — 77063 BREAST TOMOSYNTHESIS BI: CPT | Mod: 26

## 2024-02-27 PROCEDURE — 77063 BREAST TOMOSYNTHESIS BI: CPT

## 2024-02-27 PROCEDURE — 77067 SCR MAMMO BI INCL CAD: CPT

## 2024-02-27 PROCEDURE — 77067 SCR MAMMO BI INCL CAD: CPT | Mod: 26

## 2024-05-08 RX ORDER — NITROFURANTOIN (MONOHYDRATE/MACROCRYSTALS) 25; 75 MG/1; MG/1
100 CAPSULE ORAL
Qty: 10 | Refills: 0 | Status: ACTIVE | COMMUNITY
Start: 2024-05-08 | End: 1900-01-01

## 2024-05-10 ENCOUNTER — NON-APPOINTMENT (OUTPATIENT)
Age: 59
End: 2024-05-10

## 2024-07-11 ENCOUNTER — APPOINTMENT (OUTPATIENT)
Dept: UROGYNECOLOGY | Facility: CLINIC | Age: 59
End: 2024-07-11
Payer: COMMERCIAL

## 2024-07-11 PROCEDURE — 99213 OFFICE O/P EST LOW 20 MIN: CPT

## 2024-07-30 ENCOUNTER — APPOINTMENT (OUTPATIENT)
Dept: UROGYNECOLOGY | Facility: HOSPITAL | Age: 59
End: 2024-07-30

## 2024-08-14 ENCOUNTER — APPOINTMENT (OUTPATIENT)
Dept: UROGYNECOLOGY | Facility: CLINIC | Age: 59
End: 2024-08-14

## 2024-09-16 ENCOUNTER — APPOINTMENT (OUTPATIENT)
Dept: DERMATOLOGY | Facility: CLINIC | Age: 59
End: 2024-09-16
Payer: COMMERCIAL

## 2024-09-16 PROCEDURE — 17110 DESTRUCTION B9 LES UP TO 14: CPT

## 2024-09-16 PROCEDURE — 99213 OFFICE O/P EST LOW 20 MIN: CPT | Mod: 25

## 2024-10-17 RX ORDER — SULFAMETHOXAZOLE AND TRIMETHOPRIM 800; 160 MG/1; MG/1
800-160 TABLET ORAL TWICE DAILY
Qty: 14 | Refills: 0 | Status: ACTIVE | COMMUNITY
Start: 2024-10-17 | End: 1900-01-01

## 2025-02-28 ENCOUNTER — OUTPATIENT (OUTPATIENT)
Dept: OUTPATIENT SERVICES | Facility: HOSPITAL | Age: 60
LOS: 1 days | End: 2025-02-28
Payer: COMMERCIAL

## 2025-02-28 ENCOUNTER — APPOINTMENT (OUTPATIENT)
Dept: RADIOLOGY | Facility: CLINIC | Age: 60
End: 2025-02-28
Payer: COMMERCIAL

## 2025-02-28 ENCOUNTER — APPOINTMENT (OUTPATIENT)
Dept: MAMMOGRAPHY | Facility: CLINIC | Age: 60
End: 2025-02-28
Payer: COMMERCIAL

## 2025-02-28 DIAGNOSIS — Z12.39 ENCOUNTER FOR OTHER SCREENING FOR MALIGNANT NEOPLASM OF BREAST: ICD-10-CM

## 2025-02-28 DIAGNOSIS — Z00.8 ENCOUNTER FOR OTHER GENERAL EXAMINATION: ICD-10-CM

## 2025-02-28 PROCEDURE — 77067 SCR MAMMO BI INCL CAD: CPT | Mod: 26

## 2025-02-28 PROCEDURE — 77080 DXA BONE DENSITY AXIAL: CPT | Mod: 26

## 2025-02-28 PROCEDURE — 77067 SCR MAMMO BI INCL CAD: CPT

## 2025-02-28 PROCEDURE — 77063 BREAST TOMOSYNTHESIS BI: CPT

## 2025-02-28 PROCEDURE — 77063 BREAST TOMOSYNTHESIS BI: CPT | Mod: 26

## 2025-02-28 PROCEDURE — 77080 DXA BONE DENSITY AXIAL: CPT

## 2025-04-22 ENCOUNTER — APPOINTMENT (OUTPATIENT)
Dept: OBGYN | Facility: CLINIC | Age: 60
End: 2025-04-22

## 2025-06-26 ENCOUNTER — RX RENEWAL (OUTPATIENT)
Age: 60
End: 2025-06-26

## 2025-08-14 ENCOUNTER — APPOINTMENT (OUTPATIENT)
Dept: DERMATOLOGY | Facility: CLINIC | Age: 60
End: 2025-08-14
Payer: COMMERCIAL

## 2025-08-14 PROCEDURE — 99213 OFFICE O/P EST LOW 20 MIN: CPT | Mod: 25

## 2025-08-14 PROCEDURE — 17110 DESTRUCTION B9 LES UP TO 14: CPT
